# Patient Record
Sex: FEMALE | Race: BLACK OR AFRICAN AMERICAN | URBAN - METROPOLITAN AREA
[De-identification: names, ages, dates, MRNs, and addresses within clinical notes are randomized per-mention and may not be internally consistent; named-entity substitution may affect disease eponyms.]

---

## 2019-07-10 ENCOUNTER — APPOINTMENT (RX ONLY)
Dept: URBAN - METROPOLITAN AREA CLINIC 45 | Facility: CLINIC | Age: 44
Setting detail: DERMATOLOGY
End: 2019-07-10

## 2019-07-10 ENCOUNTER — RX ONLY (OUTPATIENT)
Age: 44
Setting detail: RX ONLY
End: 2019-07-10

## 2019-07-10 DIAGNOSIS — L50.1 IDIOPATHIC URTICARIA: ICD-10-CM

## 2019-07-10 DIAGNOSIS — L29.8 OTHER PRURITUS: ICD-10-CM

## 2019-07-10 DIAGNOSIS — L29.89 OTHER PRURITUS: ICD-10-CM

## 2019-07-10 PROBLEM — L30.9 DERMATITIS, UNSPECIFIED: Status: ACTIVE | Noted: 2019-07-10

## 2019-07-10 PROCEDURE — ? MEDICATION COUNSELING

## 2019-07-10 PROCEDURE — ? COUNSELING

## 2019-07-10 PROCEDURE — ? BIOPSY BY PUNCH METHOD

## 2019-07-10 PROCEDURE — 11104 PUNCH BX SKIN SINGLE LESION: CPT

## 2019-07-10 PROCEDURE — 99202 OFFICE O/P NEW SF 15 MIN: CPT | Mod: 25

## 2019-07-10 PROCEDURE — ? PRESCRIPTION

## 2019-07-10 RX ORDER — HYDROXYZINE HYDROCHLORIDE 25 MG/1
25MG TABLET, FILM COATED ORAL PRN
Qty: 30 | Refills: 2 | Status: ERX | COMMUNITY
Start: 2019-07-10

## 2019-07-10 RX ORDER — METHYLPREDNISOLONE 4 MG/1
4MG TABLET ORAL DAILY
Qty: 1 | Refills: 0 | Status: ERX | COMMUNITY
Start: 2019-07-10

## 2019-07-10 RX ORDER — PERMETHRIN 50 MG/G
5% CREAM TOPICAL WEEKLY
Qty: 2 | Refills: 0 | Status: ERX | COMMUNITY
Start: 2019-07-10

## 2019-07-10 RX ORDER — TRIAMCINOLONE ACETONIDE 1 MG/G
0.1% OINTMENT TOPICAL BID
Qty: 1 | Refills: 0 | Status: ERX | COMMUNITY
Start: 2019-07-10

## 2019-07-10 RX ADMIN — PERMETHRIN 5%: 50 CREAM TOPICAL at 14:12

## 2019-07-10 RX ADMIN — HYDROXYZINE HYDROCHLORIDE 25MG: 25 TABLET, FILM COATED ORAL at 14:06

## 2019-07-10 RX ADMIN — TRIAMCINOLONE ACETONIDE 0.1%: 1 OINTMENT TOPICAL at 14:14

## 2019-07-10 ASSESSMENT — LOCATION DETAILED DESCRIPTION DERM
LOCATION DETAILED: EPIGASTRIC SKIN
LOCATION DETAILED: LEFT ANTERIOR DISTAL THIGH

## 2019-07-10 ASSESSMENT — LOCATION SIMPLE DESCRIPTION DERM
LOCATION SIMPLE: ABDOMEN
LOCATION SIMPLE: LEFT THIGH

## 2019-07-10 ASSESSMENT — LOCATION ZONE DERM
LOCATION ZONE: TRUNK
LOCATION ZONE: LEG

## 2019-07-10 NOTE — PROCEDURE: BIOPSY BY PUNCH METHOD
Render Post-Care Instructions In Note?: yes
Bill 44885 For Specimen Handling/Conveyance To Laboratory?: no
Wound Care: Aquaphor
Anesthesia Volume In Cc (Will Not Render If 0): 1
X Depth Of Punch In Cm (Optional): 0
Epidermal Sutures: 4-0 Ethilon
Billing Type: Third-Party Bill
Biopsy Type: H and E
Hemostasis: Pressure
Dressing: pressure dressing
Lab: 6
Post-Care Instructions: I reviewed with the patient in detail post-care instructions. Patient is to keep the biopsy site dry overnight, and then apply petrolatum or Aquaphor  1-2  times daily until healed.
Lab Facility: 3
Home Suture Removal Text: N/a
Anesthesia Type: 2% lidocaine with epinephrine and a 1:10 solution of 8.4% sodium bicarbonate
Consent: Written consent was obtained and risks were reviewed including but not limited to scarring, infection, bleeding, scabbing, incomplete removal, nerve damage and allergy to anesthesia.
Detail Level: Detailed
Notification Instructions: Patient will be notified of biopsy results. However, patient instructed to call the office if not contacted within 2 weeks.
Suture Removal: 14 days
Punch Size In Mm: 4

## 2019-07-10 NOTE — HPI: RASH
What Type Of Note Output Would You Prefer (Optional)?: Bullet Format
How Severe Is Your Rash?: mild
Is This A New Presentation, Or A Follow-Up?: Rash
Additional History: Patient concerned about possible scabies.

## 2019-07-10 NOTE — PROCEDURE: MEDICATION COUNSELING
Xelisidraz Pregnancy And Lactation Text: This medication is Pregnancy Category D and is not considered safe during pregnancy.  The risk during breast feeding is also uncertain.

## 2019-07-24 ENCOUNTER — APPOINTMENT (RX ONLY)
Dept: URBAN - METROPOLITAN AREA CLINIC 45 | Facility: CLINIC | Age: 44
Setting detail: DERMATOLOGY
End: 2019-07-24

## 2019-07-24 DIAGNOSIS — Z48.02 ENCOUNTER FOR REMOVAL OF SUTURES: ICD-10-CM

## 2019-07-24 PROCEDURE — ? SUTURE REMOVAL (GLOBAL PERIOD)

## 2019-07-24 ASSESSMENT — LOCATION DETAILED DESCRIPTION DERM: LOCATION DETAILED: LEFT ANTERIOR DISTAL THIGH

## 2019-07-24 ASSESSMENT — LOCATION ZONE DERM: LOCATION ZONE: LEG

## 2019-07-24 ASSESSMENT — LOCATION SIMPLE DESCRIPTION DERM: LOCATION SIMPLE: LEFT THIGH

## 2022-11-11 NOTE — PROCEDURE: MEDICATION COUNSELING
1 person assist Sski Pregnancy And Lactation Text: This medication is Pregnancy Category D and isn't considered safe during pregnancy. It is excreted in breast milk.

## 2025-03-11 NOTE — PROCEDURE: MEDICATION COUNSELING
patient Finasteride Pregnancy And Lactation Text: This medication is absolutely contraindicated during pregnancy. It is unknown if it is excreted in breast milk.